# Patient Record
Sex: FEMALE | NOT HISPANIC OR LATINO | URBAN - METROPOLITAN AREA
[De-identification: names, ages, dates, MRNs, and addresses within clinical notes are randomized per-mention and may not be internally consistent; named-entity substitution may affect disease eponyms.]

---

## 2023-10-29 DIAGNOSIS — R92.0 MAMMOGRAPHIC MICROCALCIFICATION: Primary | ICD-10-CM

## 2023-10-29 PROBLEM — N63.25 BREAST LUMP ON LEFT SIDE AT 9 O'CLOCK POSITION: Status: ACTIVE | Noted: 2023-10-29

## 2023-10-29 PROBLEM — N60.11 DIFFUSE CYSTIC MASTOPATHY OF RIGHT BREAST: Status: ACTIVE | Noted: 2023-10-29

## 2023-10-29 PROBLEM — N63.12 MASS OF UPPER INNER QUADRANT OF RIGHT BREAST: Status: ACTIVE | Noted: 2023-10-29

## 2023-10-29 NOTE — PROGRESS NOTES
[facility]  Jose Parra MD, FACS  Avery Lozoya NP-MIKE      Date of Service: 11/6/2023      Chief Complaint:   Teresa Larios is a 73 y.o. female presenting today for her annual evaluation.  She is due for a mammogram. She reports no interval changes.     History of Present Illness:    Mrs. Larios first presented August 22, 2016 after her Right mammogram demonstrated an area of suspicious microcalcifications. Stereotactic biopsy was benign. Imaging in 2020 showed a complicated cysts in the right breast and cyst and probable fat necrosis in the left breast. They have remained stable and require no further follow up. MD::: MD Daniel Kim MD.    Past Medical History:   Diagnosis Date    A-fib     Breast lump on left side at 9 o'clock position 10/29/2023    CAD (coronary artery disease)     Congestive heart failure     Diffuse cystic mastopathy of right breast 10/29/2023    HTN (hypertension)     Hypothyroid     Mammographic microcalcification 10/29/2023    Mass of upper inner quadrant of right breast 10/29/2023    Muscle spasm     Pneumonia     Sepsis       Past Surgical History:   Procedure Laterality Date    APPENDECTOMY      CABGx 2 1/19/2023      cataract Surgery       KNEE SURGERY      LEG SURGERY      one ovary removed       patent ductus arteriosus (PDA) ligation       STEREOTACTIC BIOPSY Right 09/2016    THYROIDECTOMY          Current Outpatient Medications:     amLODIPine (NORVASC) 5 MG tablet, Take 5 mg by mouth., Disp: , Rfl:     carvediloL (COREG) 25 MG tablet, Take 25 mg by mouth 2 (two) times daily., Disp: , Rfl:     digoxin (LANOXIN) 250 mcg tablet, Take 250 mcg by mouth., Disp: , Rfl:     gabapentin (NEURONTIN) 600 MG tablet, Take 600 mg by mouth., Disp: , Rfl:     levothyroxine (SYNTHROID) 150 MCG tablet, Take 150 mcg by mouth., Disp: , Rfl:     lisinopriL (PRINIVIL,ZESTRIL) 40 MG tablet, Take 40 mg by mouth., Disp: , Rfl:     simvastatin (ZOCOR) 10 MG  tablet, Take 10 mg by mouth every evening., Disp: , Rfl:     tiZANidine (ZANAFLEX) 4 MG tablet, Take 4 mg by mouth 3 (three) times daily., Disp: , Rfl:     warfarin (COUMADIN) 5 MG tablet, Take 5 mg by mouth., Disp: , Rfl:    Review of patient's allergies indicates:  No Known Allergies   Social History     Tobacco Use    Smoking status: Former     Types: Cigarettes    Smokeless tobacco: Never   Substance Use Topics    Alcohol use: Not on file      Family History   Problem Relation Age of Onset    Breast cancer Neg Hx     Ovarian cancer Neg Hx         Review of Systems   Integumentary:  Negative for color change, rash, mole/lesion, breast mass, breast discharge and breast tenderness.   Breast: Negative for mass and tenderness       Physical Exam   HENT:   Head: Normocephalic.   Pulmonary/Chest: Right breast exhibits no inverted nipple, no mass, no nipple discharge, no skin change and no tenderness. Left breast exhibits no inverted nipple, no mass, no nipple discharge, no skin change and no tenderness. No breast swelling.   Genitourinary: No breast swelling.   Musculoskeletal: Lymphadenopathy:      Upper Body:      Right upper body: No supraclavicular or axillary adenopathy.      Left upper body: No supraclavicular or axillary adenopathy.     Neurological: She is alert.        MAMMOGRAM REPORT: FINDINGS: The patient has had right breast core biopsy.The breast tissue is extremely dense, which significantly lowers the sensitivity of mammography. There are no suspicious masses or suspicious calcifications seen to suggest malignancy. Benign-type calcifications are identified. IMPRESSION: No evidence of malignancy. No significant change when compared to the previous exam. Follow-up mammography is recommended in one year.  ASSESSMENT and PLAN OF CARE     1. Mammographic microcalcification  Assessment & Plan:  We reviewed our findings today and her questions were answered.  She understands that her imaging and exams have  remained stable (and show nothing concerning).  She is comfortable being followed in a conservative fashion.      She understands the importance of monthly self-breast examination and knows to report any and all changes as they occur.        2. Mass of upper inner quadrant of right breast  Assessment & Plan:  Same as above      3. Breast lump on left side at 9 o'clock position  Assessment & Plan:  Her imaging and exams have remained stable.       4. Diffuse cystic mastopathy of right breast  Assessment & Plan:  We discussed our Fibrocystic Mastopathy Protocol in detail. She should take Vitamin E 800 IU everyday x 3 months or until non-tender then can stop Vitamin E vs. continue daily at 400 IU.  The use of ice packs or warms soaks to tender area of the breast may also be of some benefit.  If warm soaks help her tenderness - She can use Aspercreme (unless allergic to Aspirin) on the affected area.  Ibuprofen (if no contraindications) at 800 mg three times per day for 5 days can also relieve many symptoms associated with swollen or inflamed tissue.  She can repeat Ibuprofen for 5 days, but then should be off for 5 days as it may cause gastric upset.  It is a good idea to wear a tight bra during the day and night to minimize movement of the tender area (Sports Bras work well).  Evening Primrose Oil can be bought over the counter and used at a dose of 3000 mg per day to help with any breast pain/tenderness not improved by implementing the above measures.        Medical Decision Making: It is my impression that this patient suffers all conditions contained in this medical document.  Each of these conditions did affect our plan of care and my medical decision making today.  It is my opinion that the medical decision making concerning this patient was of minimal  difficulty based on the aforementioned conditions.  Any further recommendations will be communicated to the patient by me.  I have reviewed and verified her  allergies, list of medications, medical and surgical histories, social history, and a pertinent review of symptoms.     Follow up: 1 year and PRN    For: NICK (D) at

## 2023-11-06 ENCOUNTER — OFFICE VISIT (OUTPATIENT)
Dept: SURGERY | Facility: CLINIC | Age: 73
End: 2023-11-06
Payer: MEDICARE

## 2023-11-06 DIAGNOSIS — N63.25 BREAST LUMP ON LEFT SIDE AT 9 O'CLOCK POSITION: ICD-10-CM

## 2023-11-06 DIAGNOSIS — R92.0 MAMMOGRAPHIC MICROCALCIFICATION: ICD-10-CM

## 2023-11-06 DIAGNOSIS — N60.11 DIFFUSE CYSTIC MASTOPATHY OF RIGHT BREAST: ICD-10-CM

## 2023-11-06 DIAGNOSIS — N63.12 MASS OF UPPER INNER QUADRANT OF RIGHT BREAST: ICD-10-CM

## 2023-11-06 PROCEDURE — 3044F HG A1C LEVEL LT 7.0%: CPT | Mod: CPTII,S$GLB,, | Performed by: NURSE PRACTITIONER

## 2023-11-06 PROCEDURE — 99213 OFFICE O/P EST LOW 20 MIN: CPT | Mod: S$GLB,,, | Performed by: NURSE PRACTITIONER

## 2023-11-06 PROCEDURE — 99999 PR PBB SHADOW E&M-EST. PATIENT-LVL III: ICD-10-PCS | Mod: PBBFAC,,, | Performed by: NURSE PRACTITIONER

## 2023-11-06 PROCEDURE — 1160F PR REVIEW ALL MEDS BY PRESCRIBER/CLIN PHARMACIST DOCUMENTED: ICD-10-PCS | Mod: CPTII,S$GLB,, | Performed by: NURSE PRACTITIONER

## 2023-11-06 PROCEDURE — 1126F AMNT PAIN NOTED NONE PRSNT: CPT | Mod: CPTII,S$GLB,, | Performed by: NURSE PRACTITIONER

## 2023-11-06 PROCEDURE — 1126F PR PAIN SEVERITY QUANTIFIED, NO PAIN PRESENT: ICD-10-PCS | Mod: CPTII,S$GLB,, | Performed by: NURSE PRACTITIONER

## 2023-11-06 PROCEDURE — 1159F MED LIST DOCD IN RCRD: CPT | Mod: CPTII,S$GLB,, | Performed by: NURSE PRACTITIONER

## 2023-11-06 PROCEDURE — 99999 PR PBB SHADOW E&M-EST. PATIENT-LVL III: CPT | Mod: PBBFAC,,, | Performed by: NURSE PRACTITIONER

## 2023-11-06 PROCEDURE — 1159F PR MEDICATION LIST DOCUMENTED IN MEDICAL RECORD: ICD-10-PCS | Mod: CPTII,S$GLB,, | Performed by: NURSE PRACTITIONER

## 2023-11-06 PROCEDURE — 1160F RVW MEDS BY RX/DR IN RCRD: CPT | Mod: CPTII,S$GLB,, | Performed by: NURSE PRACTITIONER

## 2023-11-06 PROCEDURE — 3044F PR MOST RECENT HEMOGLOBIN A1C LEVEL <7.0%: ICD-10-PCS | Mod: CPTII,S$GLB,, | Performed by: NURSE PRACTITIONER

## 2023-11-06 PROCEDURE — 99213 PR OFFICE/OUTPT VISIT, EST, LEVL III, 20-29 MIN: ICD-10-PCS | Mod: S$GLB,,, | Performed by: NURSE PRACTITIONER

## 2023-11-06 RX ORDER — WARFARIN SODIUM 5 MG/1
5 TABLET ORAL
COMMUNITY
Start: 2023-08-16

## 2023-11-06 RX ORDER — GABAPENTIN 600 MG/1
600 TABLET ORAL
COMMUNITY

## 2023-11-06 RX ORDER — LISINOPRIL 40 MG/1
40 TABLET ORAL
COMMUNITY

## 2023-11-06 RX ORDER — CARVEDILOL 25 MG/1
25 TABLET ORAL 2 TIMES DAILY
COMMUNITY
Start: 2023-10-06

## 2023-11-06 RX ORDER — TIZANIDINE 4 MG/1
4 TABLET ORAL 3 TIMES DAILY
COMMUNITY
Start: 2023-10-15

## 2023-11-06 RX ORDER — AMLODIPINE BESYLATE 5 MG/1
5 TABLET ORAL
COMMUNITY
Start: 2023-11-02

## 2023-11-06 RX ORDER — LEVOTHYROXINE SODIUM 150 UG/1
150 TABLET ORAL
COMMUNITY
Start: 2023-10-06

## 2023-11-06 RX ORDER — DIGOXIN 250 MCG
250 TABLET ORAL
COMMUNITY

## 2023-11-06 RX ORDER — SIMVASTATIN 10 MG/1
10 TABLET, FILM COATED ORAL NIGHTLY
COMMUNITY
Start: 2023-10-06

## 2024-11-01 DIAGNOSIS — R92.0 MAMMOGRAPHIC MICROCALCIFICATION: Primary | ICD-10-CM
